# Patient Record
Sex: FEMALE | Race: ASIAN
[De-identification: names, ages, dates, MRNs, and addresses within clinical notes are randomized per-mention and may not be internally consistent; named-entity substitution may affect disease eponyms.]

---

## 2017-11-29 ENCOUNTER — APPOINTMENT (OUTPATIENT)
Dept: OBGYN | Facility: CLINIC | Age: 52
End: 2017-11-29
Payer: COMMERCIAL

## 2017-11-29 VITALS
SYSTOLIC BLOOD PRESSURE: 147 MMHG | DIASTOLIC BLOOD PRESSURE: 95 MMHG | BODY MASS INDEX: 29.95 KG/M2 | HEIGHT: 63 IN | WEIGHT: 169 LBS | HEART RATE: 75 BPM

## 2017-11-29 DIAGNOSIS — N95.9 UNSPECIFIED MENOPAUSAL AND PERIMENOPAUSAL DISORDER: ICD-10-CM

## 2017-11-29 PROCEDURE — 99396 PREV VISIT EST AGE 40-64: CPT

## 2017-12-11 LAB
CYTOLOGY CVX/VAG DOC THIN PREP: NORMAL
HPV HIGH+LOW RISK DNA PNL CVX: NOT DETECTED

## 2019-08-27 ENCOUNTER — APPOINTMENT (OUTPATIENT)
Dept: OBGYN | Facility: CLINIC | Age: 54
End: 2019-08-27
Payer: COMMERCIAL

## 2019-08-27 VITALS
WEIGHT: 164 LBS | HEART RATE: 63 BPM | HEIGHT: 63 IN | DIASTOLIC BLOOD PRESSURE: 88 MMHG | BODY MASS INDEX: 29.06 KG/M2 | SYSTOLIC BLOOD PRESSURE: 147 MMHG

## 2019-08-27 DIAGNOSIS — Z01.419 ENCOUNTER FOR GYNECOLOGICAL EXAMINATION (GENERAL) (ROUTINE) W/OUT ABNORMAL FINDINGS: ICD-10-CM

## 2019-08-27 DIAGNOSIS — N95.2 POSTMENOPAUSAL ATROPHIC VAGINITIS: ICD-10-CM

## 2019-08-27 PROCEDURE — 99396 PREV VISIT EST AGE 40-64: CPT

## 2019-08-27 PROCEDURE — 99213 OFFICE O/P EST LOW 20 MIN: CPT | Mod: 25

## 2019-08-27 NOTE — PHYSICAL EXAM
[Awake] : awake [Alert] : alert [Soft] : soft [Oriented x3] : oriented to person, place, and time [Normal] : uterus [No Bleeding] : there was no active vaginal bleeding [Uterine Adnexae] : were not tender and not enlarged [Vulvar Atrophy] : vulvar atrophy [Atrophy] : atrophy [Acute Distress] : no acute distress [Mass] : no breast mass [Nipple Discharge] : no nipple discharge [Axillary LAD] : no axillary lymphadenopathy [Tender] : non tender

## 2019-08-29 ENCOUNTER — APPOINTMENT (OUTPATIENT)
Dept: OBGYN | Facility: CLINIC | Age: 54
End: 2019-08-29
Payer: COMMERCIAL

## 2019-08-29 ENCOUNTER — ASOB RESULT (OUTPATIENT)
Age: 54
End: 2019-08-29

## 2019-08-29 PROCEDURE — 76830 TRANSVAGINAL US NON-OB: CPT

## 2019-08-30 LAB — HPV HIGH+LOW RISK DNA PNL CVX: NOT DETECTED

## 2019-09-08 LAB — CYTOLOGY CVX/VAG DOC THIN PREP: ABNORMAL

## 2019-09-18 ENCOUNTER — APPOINTMENT (OUTPATIENT)
Dept: OBGYN | Facility: CLINIC | Age: 54
End: 2019-09-18
Payer: COMMERCIAL

## 2019-09-18 VITALS
BODY MASS INDEX: 29.06 KG/M2 | DIASTOLIC BLOOD PRESSURE: 84 MMHG | HEART RATE: 71 BPM | SYSTOLIC BLOOD PRESSURE: 134 MMHG | HEIGHT: 63 IN | WEIGHT: 164 LBS

## 2019-09-18 DIAGNOSIS — N95.0 POSTMENOPAUSAL BLEEDING: ICD-10-CM

## 2019-09-18 PROCEDURE — 58100 BIOPSY OF UTERUS LINING: CPT

## 2019-09-18 PROCEDURE — 99213 OFFICE O/P EST LOW 20 MIN: CPT | Mod: 25

## 2019-09-18 NOTE — PROCEDURE
[Endometrial Biopsy] : Endometrial biopsy [Risks] : risks [Post-Menop. Bleeding] : post-menopausal bleeding [Alternatives] : alternatives [Benefits] : benefits [Patient] : patient [Infection] : infection [Bleeding] : bleeding [Allergic Reaction] : allergic reaction [Pain] : pain [Uterine Perforation] : uterine perforation [CONSENT OBTAINED] : written consent was obtained prior to the procedure. [Neg Pregnancy Test] : a pregnancy test was negative [None] : none [Betadine] : Betadine [1%] : 1% [Without Epi] : without epinephrine [Tenaculum] : a single toothed tenaculum [Anteverted] : anteverted [Easy Passage] : allowed easy passage of a uterine sound without dilation [Pipelle] : a Pipelle endometrial suction curette [Scant] : a scant [Tolerated Well] : the patient tolerated the procedure well [Sent to Histology] : the specimen was place in buffered formalin and sent for pathlogy [No Complications] : there were no complications

## 2019-09-23 LAB — CORE LAB BIOPSY: NORMAL

## 2020-09-11 ENCOUNTER — APPOINTMENT (OUTPATIENT)
Dept: PULMONOLOGY | Facility: CLINIC | Age: 55
End: 2020-09-11
Payer: COMMERCIAL

## 2020-09-11 VITALS
HEIGHT: 62 IN | SYSTOLIC BLOOD PRESSURE: 138 MMHG | DIASTOLIC BLOOD PRESSURE: 90 MMHG | WEIGHT: 154 LBS | OXYGEN SATURATION: 94 % | HEART RATE: 72 BPM | BODY MASS INDEX: 28.34 KG/M2 | TEMPERATURE: 98.5 F

## 2020-09-11 DIAGNOSIS — R05 COUGH: ICD-10-CM

## 2020-09-11 DIAGNOSIS — R76.12 NONSPECIFIC REACTION TO CELL MEDIATED IMMUNITY MEASUREMENT OF GAMMA INTERFERON ANTIGEN RESPONSE W/OUT ACTIVE TUBERCULOSIS: ICD-10-CM

## 2020-09-11 DIAGNOSIS — Z22.7 LATENT TUBERCULOSIS: ICD-10-CM

## 2020-09-11 DIAGNOSIS — Z86.79 PERSONAL HISTORY OF OTHER DISEASES OF THE CIRCULATORY SYSTEM: ICD-10-CM

## 2020-09-11 PROCEDURE — 99204 OFFICE O/P NEW MOD 45 MIN: CPT

## 2020-09-12 PROBLEM — Z86.79 HISTORY OF HYPERTENSION: Status: RESOLVED | Noted: 2020-09-12 | Resolved: 2020-09-12

## 2020-09-12 PROBLEM — R76.12 POSITIVE QUANTIFERON-TB GOLD TEST: Status: ACTIVE | Noted: 2020-09-12

## 2020-09-12 PROBLEM — R05 CHRONIC COUGH: Status: ACTIVE | Noted: 2020-09-12

## 2020-09-12 PROBLEM — Z22.7 LATENT TUBERCULOSIS BY BLOOD TEST: Status: ACTIVE | Noted: 2020-09-12

## 2020-09-12 RX ORDER — VALSARTAN 160 MG/1
160 TABLET, COATED ORAL
Refills: 0 | Status: ACTIVE | COMMUNITY

## 2020-09-12 NOTE — DISCUSSION/SUMMARY
[FreeTextEntry1] : She is a 54 year old who presented with a cough since July or 2020. The cough is non-productive. No chest pain or shortness of breath reported. No fever, chills or sweats. No sick contacts.Occupational history is unremarkable. Never smoked. 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 Never had it done before. She was born and raised in [facility] and [place] the [place] 25. No known exposure to TB either in her native [place] or [place] the [place]. \par \par There are no clinical signs of active TB. She most likely was exposed many years ago and is not a candidate for prophylaxis at this point in time. \par \par Her cough is not likely related to the above. She has tried multiple remedies that did not help her. I suggested if her cough does not resolve in the near future to have a PFT after a current nasopharyngeal swab for COVID-19-PCR has been obtained and the result is negative. \par \par Apart from that I will see her again as needed. \par

## 2020-09-12 NOTE — PROCEDURE
[FreeTextEntry1] : Chest x-ray 8/10/20: No acute disease. \par \par Quantiferon gold assay 2/28/20: positive.

## 2020-09-12 NOTE — PHYSICAL EXAM
[No Acute Distress] : no acute distress [Normal Oropharynx] : normal oropharynx [Normal S1, S2] : normal s1, s2 [No Neck Mass] : no neck mass [No HSM] : no hsm [No Abnormalities] : no abnormalities [Clear to Auscultation Bilaterally] : clear to auscultation bilaterally [No Focal Deficits] : no focal deficits [No Clubbing] : no clubbing [Normal Color/ Pigmentation] : normal color/ pigmentation [Normal Gait] : normal gait [Oriented x3] : oriented x3

## 2020-09-12 NOTE — REVIEW OF SYSTEMS
[Cough] : cough [Fever] : no fever [Chills] : no chills [Hemoptysis] : no hemoptysis [Chest Tightness] : no chest tightness [Sputum] : no sputum [Dyspnea] : no dyspnea [Wheezing] : no wheezing [Chest Discomfort] : no chest discomfort [Hay Fever] : no hay fever [GERD] : no gerd [Arthralgias] : no arthralgias [Myalgias] : no myalgias [Rash] : no rash [Anemia] : no anemia [Headache] : no headache [Diabetes] : no diabetes [Thyroid Problem] : no thyroid problem

## 2020-09-12 NOTE — HISTORY OF PRESENT ILLNESS
[Never] : never [TextBox_4] : She is a 54 year old who presented with a cough for several months. Was found to have a positive IGRA for TB. Never had it done before. The cough is non-productive. No chest pain or shortness of breath reported. No fever, chills or sweats. No sick contacts. [place] [facility] [place] 25. No known exposure to TB either in her native [place]. Occupational history is unremarkable. Never smoked. \par .\par

## 2021-10-05 ENCOUNTER — APPOINTMENT (OUTPATIENT)
Dept: OBGYN | Facility: CLINIC | Age: 56
End: 2021-10-05
Payer: COMMERCIAL

## 2021-10-05 VITALS — DIASTOLIC BLOOD PRESSURE: 86 MMHG | BODY MASS INDEX: 27.44 KG/M2 | SYSTOLIC BLOOD PRESSURE: 140 MMHG | WEIGHT: 150 LBS

## 2021-10-05 DIAGNOSIS — R92.2 INCONCLUSIVE MAMMOGRAM: ICD-10-CM

## 2021-10-05 DIAGNOSIS — Z01.419 ENCOUNTER FOR GYNECOLOGICAL EXAMINATION (GENERAL) (ROUTINE) W/OUT ABNORMAL FINDINGS: ICD-10-CM

## 2021-10-05 PROCEDURE — 99396 PREV VISIT EST AGE 40-64: CPT

## 2021-10-05 NOTE — DISCUSSION/SUMMARY
[FreeTextEntry1] : 56 y/o  LMP 7-8 years ago, no HRT\par pap with cotesting\par Screening Mammogramand Breast U/s dense breasts\par UTD with Colonoscopy\par lubricants for vaginal atrophy, decined estrogen cream\par f/u 1 year/PRN

## 2021-10-05 NOTE — HISTORY OF PRESENT ILLNESS
[FreeTextEntry1] : 56 y/o  LMP 7-8 years ago, no HRT\par no hot flashes, + vaginal dryness \par colonoscopy 5 years ago

## 2021-10-05 NOTE — PHYSICAL EXAM
[Examination Of The Breasts] : a normal appearance [No Masses] : no breast masses were palpable [Labia Majora] : normal [Labia Minora] : normal [Atrophy] : atrophy [Normal] : normal [Uterine Adnexae] : normal

## 2021-10-07 ENCOUNTER — TRANSCRIPTION ENCOUNTER (OUTPATIENT)
Age: 56
End: 2021-10-07

## 2021-10-07 LAB — HPV HIGH+LOW RISK DNA PNL CVX: NOT DETECTED

## 2021-10-10 LAB — CYTOLOGY CVX/VAG DOC THIN PREP: ABNORMAL

## 2022-02-10 ENCOUNTER — APPOINTMENT (OUTPATIENT)
Dept: OBGYN | Facility: CLINIC | Age: 57
End: 2022-02-10

## 2022-11-17 ENCOUNTER — APPOINTMENT (OUTPATIENT)
Dept: OBGYN | Facility: CLINIC | Age: 57
End: 2022-11-17

## 2022-11-17 VITALS
DIASTOLIC BLOOD PRESSURE: 84 MMHG | SYSTOLIC BLOOD PRESSURE: 120 MMHG | BODY MASS INDEX: 30.36 KG/M2 | HEIGHT: 62 IN | WEIGHT: 165 LBS

## 2022-11-17 PROCEDURE — 99396 PREV VISIT EST AGE 40-64: CPT

## 2022-11-17 NOTE — HISTORY OF PRESENT ILLNESS
[FreeTextEntry1] : 55 y/o postmenopausal woman \par Pap 2021 neg\par UTD with mammogram\par colonoscopy last week\par no vaginal bleeding, no complaints COVID vaccinated 2 + booster last year

## 2022-11-17 NOTE — DISCUSSION/SUMMARY
[FreeTextEntry1] : 55 y/o postmenopausal woman \par Pap 2021 neg\par UTD with mammogram,  patient brought report, refer for breast us\par colonoscopy last week\par vaginal atrophy, occasional discomfort, no bleeding  waterbased lubricants. \par f/u 1 year

## 2023-12-11 ENCOUNTER — NON-APPOINTMENT (OUTPATIENT)
Age: 58
End: 2023-12-11

## 2023-12-15 ENCOUNTER — APPOINTMENT (OUTPATIENT)
Dept: OBGYN | Facility: CLINIC | Age: 58
End: 2023-12-15
Payer: COMMERCIAL

## 2023-12-15 VITALS — WEIGHT: 167.8 LBS | BODY MASS INDEX: 30.69 KG/M2 | DIASTOLIC BLOOD PRESSURE: 88 MMHG | SYSTOLIC BLOOD PRESSURE: 144 MMHG

## 2023-12-15 DIAGNOSIS — Z01.419 ENCOUNTER FOR GYNECOLOGICAL EXAMINATION (GENERAL) (ROUTINE) W/OUT ABNORMAL FINDINGS: ICD-10-CM

## 2023-12-15 PROCEDURE — 99396 PREV VISIT EST AGE 40-64: CPT

## 2023-12-15 NOTE — DISCUSSION/SUMMARY
[FreeTextEntry1] : 59 y/o postmenopausal woman  Pap smear with cotesting  Screening mammogram Screening colonoscopy

## 2024-11-12 ENCOUNTER — APPOINTMENT (OUTPATIENT)
Dept: OBGYN | Facility: CLINIC | Age: 59
End: 2024-11-12
Payer: COMMERCIAL

## 2024-11-12 VITALS
HEIGHT: 62 IN | DIASTOLIC BLOOD PRESSURE: 86 MMHG | BODY MASS INDEX: 31.28 KG/M2 | SYSTOLIC BLOOD PRESSURE: 130 MMHG | WEIGHT: 170 LBS

## 2024-11-12 DIAGNOSIS — Z01.419 ENCOUNTER FOR GYNECOLOGICAL EXAMINATION (GENERAL) (ROUTINE) W/OUT ABNORMAL FINDINGS: ICD-10-CM

## 2024-11-12 PROCEDURE — 99396 PREV VISIT EST AGE 40-64: CPT

## 2024-11-15 LAB — HPV HIGH+LOW RISK DNA PNL CVX: NOT DETECTED

## 2024-11-19 LAB — CYTOLOGY CVX/VAG DOC THIN PREP: ABNORMAL

## 2025-01-31 ENCOUNTER — EMERGENCY (EMERGENCY)
Facility: HOSPITAL | Age: 60
LOS: 1 days | Discharge: HOME | End: 2025-01-31
Attending: EMERGENCY MEDICINE
Payer: COMMERCIAL

## 2025-01-31 VITALS
WEIGHT: 164.91 LBS | HEART RATE: 85 BPM | DIASTOLIC BLOOD PRESSURE: 85 MMHG | OXYGEN SATURATION: 96 % | HEIGHT: 64 IN | TEMPERATURE: 98 F | SYSTOLIC BLOOD PRESSURE: 127 MMHG | RESPIRATION RATE: 16 BRPM

## 2025-01-31 PROCEDURE — 99285 EMERGENCY DEPT VISIT HI MDM: CPT

## 2025-01-31 NOTE — ED ADULT TRIAGE NOTE - SOURCE OF INFORMATION
Medicare Annual Wellness Visit    Davon Tab is here for Medicare AWV    Assessment & Plan   Medicare annual wellness visit, subsequent  Dyskinesia  Paroxysmal atrial fibrillation (HCC)  Sensory neuropathy  -     CBC with Auto Differential; Future  -     Comprehensive Metabolic Panel; Future  Cognitive disorder  Impaired functional mobility, balance, gait, and endurance  Mild episode of recurrent major depressive disorder ([facility] [address]    Recommendations for Preventive Services Due: see orders and patient instructions/AVS.  Recommended screening schedule for the next 5-10 years is provided to the patient in written form: see Patient Instructions/AVS.   Dexa nl 2018--5 yr ok; has mammo for Feb ;Neurology is Jan and can hopefully assess the muscle sx  Return for Medicare Annual Wellness Visit in 1 year. Subjective   The following acute and/or chronic problems were also addressed today:  Dementia; neuropathy; movements-followed by Dr Vignesh Hamilton -hx cognitive deficit-on Namenda and stable; has NCV with neuropathy changes; on gabapentin for sx; has leg movements and trial of sinemet not helped  Mood fine on zoloft  Patient's complete Health Risk Assessment and screening values have been reviewed and are found in Flowsheets. The following problems were reviewed today and where indicated follow up appointments were made and/or referrals ordered.     Positive Risk Factor Screenings with Interventions:    Fall Risk:  Do you feel unsteady or are you worried about falling? : (!) yes  2 or more falls in past year?: no  Fall with injury in past year?: no   Fall Risk Interventions:    Patient declines any further evaluation/treatment for this issue  Has help 5d -for housekeeping, bath and home assist            General Health and ACP:  General  In general, how would you say your health is?: Fair  In the past 7 days, have you experienced any of the following: New or Increased Pain, New or Increased Fatigue, Loneliness, Social Isolation, Stress or Anger?: No  Do you get the social and emotional support that you need?: (!) No  Do you have a Living Will?: Yes    Advance Directives       Power of [address] Will ACP-Advance Directive ACP-Power of     Not on File Not on File Not on File Filed        General Health Risk Interventions:  No pain ; independent in small apt    Health Habits/Nutrition:  Physical Activity: Inactive    Days of Exercise per Week: 0 days    Minutes of Exercise per Session: 0 min     Have you lost any weight without trying in the past 3 months?: (!) Yes  Body mass index: 22.59  Have you seen the dentist within the past year?: (!) No  Health Habits/Nutrition Interventions:  Uses rolling walker at times -no falls in over a year      ADLs:  In the past 7 days, did you need help from others to perform any of the following everyday activities: Eating, dressing, grooming, bathing, toileting, or walking/balance?: (!) Yes  Select all that apply: (!) Bathing  In the past 7 days, did you need help from others to take care of any of the following: Laundry, housekeeping, banking/finances, shopping, telephone use, food preparation, transportation, or taking medications?: (!) Yes  Select all that apply: [employer], Shopping, Taking Medications  ADL Interventions:  Patient declines any further evaluation/treatment for this issue          Objective   Vitals:    11/23/22 0933 11/23/22 0940 11/23/22 1022   BP: (!) 154/102 (!) 156/92 137/86   Pulse: 70 72    Temp: 98 °F (36.7 °C)     SpO2: 98%     Weight: 123 lb 8 oz (56 kg)     Height: 5' 2\" (1.575 m)        Body mass index is 22.59 kg/m².       General Appearance: alert and oriented to person, place and time, well-developed and well-nourished, in no acute distress  Pulmonary/Chest: clear to auscultation bilaterally- no wheezes, rales or rhonchi, normal air movement, no respiratory distress  Cardiovascular: normal rate, normal S1 and S2, no gallops, intact distal pulses, and no carotid bruits     Rhythmic muscle contractions proximal thighs -right more than left -cause foot taps--no arm cogwheel or tremor-does shuffle in gait  Allergies   Allergen Reactions    Aspirin Nausea Only     Pt can take aspirin 81mg Pt c/o upset stomach with higher dose    Atorvastatin Other (See Comments)     Leg weakness    Codeine Nausea Only    Fluticasone-Salmeterol Other (See Comments)     shakes     Prior to Visit Medications    Medication Sig Taking? Authorizing Provider   carbidopa-levodopa (SINEMET)  MG per tablet Take 1 tablet by mouth 2 times daily Yes Lupis Hawthorne MD   gabapentin (NEURONTIN) 400 MG capsule Take 1 capsule by mouth 3 times daily.  Yes Lupis Hawthorne MD   montelukast (SINGULAIR) 10 MG tablet TAKE 1 TABLET BY MOUTH EVERY DAY Yes TYRONE Persaud MD   nitrofurantoin (MACRODANTIN) 100 MG capsule TAKE 1 CAPSULE BY MOUTH EVERY DAY AT NIGHT Yes Darilyn Moritz, MD   memantine (NAMENDA) 10 MG tablet Take 1 tablet by mouth 2 times daily Yes Lupis Hawthorne MD   OXYGEN Inhale into the lungs 2 liters at night only Yes Ar Automatic Reconciliation   albuterol sulfate  (90 Base) MCG/ACT inhaler Inhale 2 puffs into the lungs every 4 hours as needed for Shortness of Breath or Wheezing Yes Ar Automatic Reconciliation   allopurinol (ZYLOPRIM) 100 MG tablet TAKE 1 TABLET BY MOUTH EVERY DAY Yes Ar Automatic Reconciliation   apixaban (ELIQUIS) 5 MG TABS tablet Take 5 mg by mouth 2 times daily Yes Ar Automatic Reconciliation   aspirin 81 MG EC tablet Take 81 mg by mouth daily Yes Ar Automatic Reconciliation   bumetanide (BUMEX) 1 MG tablet Take 1 mg by mouth every other day Yes Ar Automatic Reconciliation   diclofenac sodium (VOLTAREN) 1 % GEL Apply topically 4 times daily Yes Ar Automatic Reconciliation   lansoprazole (PREVACID) 30 MG delayed release capsule TAKE 1 CAPSULE BY MOUTH TWICE A DAY Yes Ar Automatic Reconciliation   metoprolol tartrate (LOPRESSOR) 25 MG tablet Take 25 mg by mouth 2 times daily Yes Ar Automatic Reconciliation   sertraline (ZOLOFT) 50 MG tablet TAKE 1 TABLET BY MOUTH EVERY DAY Yes Ar Automatic Reconciliation   tamoxifen (NOLVADEX) 20 MG tablet TAKE 1 TABLET BY MOUTH EVERY DAY Yes Ar Automatic Reconciliation       CareTeam (Including outside providers/suppliers regularly involved in providing care):   Patient Care Team:  Carlos Weaver MD as PCP - [facility]  Carlos Weaver MD as PCP - [facility] Empaneled Provider     Reviewed and updated this visit:  Tobacco  Allergies  Meds  Problems  Med Hx  Surg Hx  Soc Hx  Fam Hx Patient

## 2025-01-31 NOTE — ED ADULT TRIAGE NOTE - CHIEF COMPLAINT QUOTE
Bloody stools x1 day w/ abdominal cramping. Pt reports BM every x2 hours w/ clots and n/v. Pt denies AC use.

## 2025-02-01 VITALS
RESPIRATION RATE: 18 BRPM | HEART RATE: 78 BPM | TEMPERATURE: 98 F | OXYGEN SATURATION: 96 % | SYSTOLIC BLOOD PRESSURE: 115 MMHG | DIASTOLIC BLOOD PRESSURE: 73 MMHG

## 2025-02-01 LAB
ALBUMIN SERPL ELPH-MCNC: 4.6 G/DL — SIGNIFICANT CHANGE UP (ref 3.3–5)
ALP SERPL-CCNC: 100 U/L — SIGNIFICANT CHANGE UP (ref 40–120)
ALT FLD-CCNC: 17 U/L — SIGNIFICANT CHANGE UP (ref 10–45)
ANION GAP SERPL CALC-SCNC: 15 MMOL/L — SIGNIFICANT CHANGE UP (ref 5–17)
APPEARANCE UR: CLEAR — SIGNIFICANT CHANGE UP
AST SERPL-CCNC: 18 U/L — SIGNIFICANT CHANGE UP (ref 10–40)
BACTERIA # UR AUTO: NEGATIVE /HPF — SIGNIFICANT CHANGE UP
BASOPHILS # BLD AUTO: 0.04 K/UL — SIGNIFICANT CHANGE UP (ref 0–0.2)
BASOPHILS NFR BLD AUTO: 0.3 % — SIGNIFICANT CHANGE UP (ref 0–2)
BILIRUB SERPL-MCNC: 0.8 MG/DL — SIGNIFICANT CHANGE UP (ref 0.2–1.2)
BILIRUB UR-MCNC: ABNORMAL
BUN SERPL-MCNC: 17 MG/DL — SIGNIFICANT CHANGE UP (ref 7–23)
CALCIUM SERPL-MCNC: 10.2 MG/DL — SIGNIFICANT CHANGE UP (ref 8.4–10.5)
CAST: 0 /LPF — SIGNIFICANT CHANGE UP (ref 0–4)
CHLORIDE SERPL-SCNC: 102 MMOL/L — SIGNIFICANT CHANGE UP (ref 96–108)
CO2 SERPL-SCNC: 23 MMOL/L — SIGNIFICANT CHANGE UP (ref 22–31)
COLOR SPEC: SIGNIFICANT CHANGE UP
CREAT SERPL-MCNC: 0.49 MG/DL — LOW (ref 0.5–1.3)
DIFF PNL FLD: ABNORMAL
EGFR: 109 ML/MIN/1.73M2 — SIGNIFICANT CHANGE UP
EOSINOPHIL # BLD AUTO: 0.05 K/UL — SIGNIFICANT CHANGE UP (ref 0–0.5)
EOSINOPHIL NFR BLD AUTO: 0.4 % — SIGNIFICANT CHANGE UP (ref 0–6)
GAS PNL BLDV: SIGNIFICANT CHANGE UP
GLUCOSE SERPL-MCNC: 108 MG/DL — HIGH (ref 70–99)
GLUCOSE UR QL: NEGATIVE MG/DL — SIGNIFICANT CHANGE UP
HCT VFR BLD CALC: 38.4 % — SIGNIFICANT CHANGE UP (ref 34.5–45)
HCT VFR BLD CALC: 41.5 % — SIGNIFICANT CHANGE UP (ref 34.5–45)
HGB BLD-MCNC: 12.8 G/DL — SIGNIFICANT CHANGE UP (ref 11.5–15.5)
HGB BLD-MCNC: 13.8 G/DL — SIGNIFICANT CHANGE UP (ref 11.5–15.5)
IMM GRANULOCYTES NFR BLD AUTO: 0.4 % — SIGNIFICANT CHANGE UP (ref 0–0.9)
KETONES UR-MCNC: 80 MG/DL
LEUKOCYTE ESTERASE UR-ACNC: ABNORMAL
LIDOCAIN IGE QN: 26 U/L — SIGNIFICANT CHANGE UP (ref 7–60)
LYMPHOCYTES # BLD AUTO: 27.1 % — SIGNIFICANT CHANGE UP (ref 13–44)
LYMPHOCYTES # BLD AUTO: 3.27 K/UL — SIGNIFICANT CHANGE UP (ref 1–3.3)
MCHC RBC-ENTMCNC: 30.8 PG — SIGNIFICANT CHANGE UP (ref 27–34)
MCHC RBC-ENTMCNC: 30.9 PG — SIGNIFICANT CHANGE UP (ref 27–34)
MCHC RBC-ENTMCNC: 33.3 G/DL — SIGNIFICANT CHANGE UP (ref 32–36)
MCHC RBC-ENTMCNC: 33.3 G/DL — SIGNIFICANT CHANGE UP (ref 32–36)
MCV RBC AUTO: 92.5 FL — SIGNIFICANT CHANGE UP (ref 80–100)
MCV RBC AUTO: 92.8 FL — SIGNIFICANT CHANGE UP (ref 80–100)
MONOCYTES # BLD AUTO: 0.64 K/UL — SIGNIFICANT CHANGE UP (ref 0–0.9)
MONOCYTES NFR BLD AUTO: 5.3 % — SIGNIFICANT CHANGE UP (ref 2–14)
NEUTROPHILS # BLD AUTO: 8 K/UL — HIGH (ref 1.8–7.4)
NEUTROPHILS NFR BLD AUTO: 66.5 % — SIGNIFICANT CHANGE UP (ref 43–77)
NITRITE UR-MCNC: NEGATIVE — SIGNIFICANT CHANGE UP
NRBC # BLD: 0 /100 WBCS — SIGNIFICANT CHANGE UP (ref 0–0)
NRBC # BLD: 0 /100 WBCS — SIGNIFICANT CHANGE UP (ref 0–0)
NRBC BLD-RTO: 0 /100 WBCS — SIGNIFICANT CHANGE UP (ref 0–0)
NRBC BLD-RTO: 0 /100 WBCS — SIGNIFICANT CHANGE UP (ref 0–0)
PH UR: 5.5 — SIGNIFICANT CHANGE UP (ref 5–8)
PLATELET # BLD AUTO: 294 K/UL — SIGNIFICANT CHANGE UP (ref 150–400)
PLATELET # BLD AUTO: 318 K/UL — SIGNIFICANT CHANGE UP (ref 150–400)
POTASSIUM SERPL-MCNC: 3.8 MMOL/L — SIGNIFICANT CHANGE UP (ref 3.5–5.3)
POTASSIUM SERPL-SCNC: 3.8 MMOL/L — SIGNIFICANT CHANGE UP (ref 3.5–5.3)
PROT SERPL-MCNC: 8 G/DL — SIGNIFICANT CHANGE UP (ref 6–8.3)
PROT UR-MCNC: 30 MG/DL
RBC # BLD: 4.15 M/UL — SIGNIFICANT CHANGE UP (ref 3.8–5.2)
RBC # BLD: 4.47 M/UL — SIGNIFICANT CHANGE UP (ref 3.8–5.2)
RBC # FLD: 12.5 % — SIGNIFICANT CHANGE UP (ref 10.3–14.5)
RBC # FLD: 12.6 % — SIGNIFICANT CHANGE UP (ref 10.3–14.5)
RBC CASTS # UR COMP ASSIST: 29 /HPF — HIGH (ref 0–4)
SODIUM SERPL-SCNC: 140 MMOL/L — SIGNIFICANT CHANGE UP (ref 135–145)
SP GR SPEC: >1.03 — HIGH (ref 1–1.03)
SQUAMOUS # UR AUTO: 1 /HPF — SIGNIFICANT CHANGE UP (ref 0–5)
UROBILINOGEN FLD QL: 1 MG/DL — SIGNIFICANT CHANGE UP (ref 0.2–1)
WBC # BLD: 12.05 K/UL — HIGH (ref 3.8–10.5)
WBC # BLD: 9.73 K/UL — SIGNIFICANT CHANGE UP (ref 3.8–10.5)
WBC # FLD AUTO: 12.05 K/UL — HIGH (ref 3.8–10.5)
WBC # FLD AUTO: 9.73 K/UL — SIGNIFICANT CHANGE UP (ref 3.8–10.5)
WBC UR QL: 7 /HPF — HIGH (ref 0–5)

## 2025-02-01 PROCEDURE — 85025 COMPLETE CBC W/AUTO DIFF WBC: CPT

## 2025-02-01 PROCEDURE — 99222 1ST HOSP IP/OBS MODERATE 55: CPT

## 2025-02-01 PROCEDURE — 87086 URINE CULTURE/COLONY COUNT: CPT

## 2025-02-01 PROCEDURE — 83605 ASSAY OF LACTIC ACID: CPT

## 2025-02-01 PROCEDURE — 84295 ASSAY OF SERUM SODIUM: CPT

## 2025-02-01 PROCEDURE — 74177 CT ABD & PELVIS W/CONTRAST: CPT | Mod: MC

## 2025-02-01 PROCEDURE — 80053 COMPREHEN METABOLIC PANEL: CPT

## 2025-02-01 PROCEDURE — 83690 ASSAY OF LIPASE: CPT

## 2025-02-01 PROCEDURE — 85018 HEMOGLOBIN: CPT

## 2025-02-01 PROCEDURE — 74177 CT ABD & PELVIS W/CONTRAST: CPT | Mod: 26

## 2025-02-01 PROCEDURE — 99285 EMERGENCY DEPT VISIT HI MDM: CPT | Mod: 25

## 2025-02-01 PROCEDURE — 96374 THER/PROPH/DIAG INJ IV PUSH: CPT

## 2025-02-01 PROCEDURE — 82330 ASSAY OF CALCIUM: CPT

## 2025-02-01 PROCEDURE — 85027 COMPLETE CBC AUTOMATED: CPT

## 2025-02-01 PROCEDURE — 84132 ASSAY OF SERUM POTASSIUM: CPT

## 2025-02-01 PROCEDURE — 82435 ASSAY OF BLOOD CHLORIDE: CPT

## 2025-02-01 PROCEDURE — 82947 ASSAY GLUCOSE BLOOD QUANT: CPT

## 2025-02-01 PROCEDURE — G0378: CPT

## 2025-02-01 PROCEDURE — 81001 URINALYSIS AUTO W/SCOPE: CPT

## 2025-02-01 PROCEDURE — 82803 BLOOD GASES ANY COMBINATION: CPT

## 2025-02-01 PROCEDURE — 85014 HEMATOCRIT: CPT

## 2025-02-01 PROCEDURE — 96375 TX/PRO/DX INJ NEW DRUG ADDON: CPT

## 2025-02-01 PROCEDURE — 93005 ELECTROCARDIOGRAM TRACING: CPT

## 2025-02-01 RX ORDER — CIPROFLOXACIN HCL 500 MG
400 TABLET ORAL EVERY 12 HOURS
Refills: 0 | Status: DISCONTINUED | OUTPATIENT
Start: 2025-02-01 | End: 2025-02-04

## 2025-02-01 RX ORDER — CIPROFLOXACIN HCL 500 MG
400 TABLET ORAL ONCE
Refills: 0 | Status: COMPLETED | OUTPATIENT
Start: 2025-02-01 | End: 2025-02-01

## 2025-02-01 RX ORDER — BACTERIOSTATIC SODIUM CHLORIDE 0.9 %
1000 VIAL (ML) INJECTION
Refills: 0 | Status: DISCONTINUED | OUTPATIENT
Start: 2025-02-01 | End: 2025-02-04

## 2025-02-01 RX ORDER — METRONIDAZOLE 500 MG
500 TABLET ORAL ONCE
Refills: 0 | Status: COMPLETED | OUTPATIENT
Start: 2025-02-01 | End: 2025-02-01

## 2025-02-01 RX ORDER — ACETAMINOPHEN 160 MG/5ML
1000 SUSPENSION ORAL ONCE
Refills: 0 | Status: COMPLETED | OUTPATIENT
Start: 2025-02-01 | End: 2025-02-01

## 2025-02-01 RX ORDER — METRONIDAZOLE 500 MG
1 TABLET ORAL
Qty: 14 | Refills: 0
Start: 2025-02-01 | End: 2025-02-07

## 2025-02-01 RX ORDER — SODIUM CHLORIDE 9 G/ML
1000 INJECTION, SOLUTION INTRAVENOUS ONCE
Refills: 0 | Status: COMPLETED | OUTPATIENT
Start: 2025-02-01 | End: 2025-02-01

## 2025-02-01 RX ORDER — METRONIDAZOLE 500 MG
TABLET ORAL
Refills: 0 | Status: DISCONTINUED | OUTPATIENT
Start: 2025-02-01 | End: 2025-02-04

## 2025-02-01 RX ORDER — METRONIDAZOLE 500 MG
500 TABLET ORAL EVERY 12 HOURS
Refills: 0 | Status: DISCONTINUED | OUTPATIENT
Start: 2025-02-01 | End: 2025-02-04

## 2025-02-01 RX ORDER — CIPROFLOXACIN HCL 500 MG
1 TABLET ORAL
Qty: 14 | Refills: 0
Start: 2025-02-01 | End: 2025-02-07

## 2025-02-01 RX ORDER — CIPROFLOXACIN HCL 500 MG
TABLET ORAL
Refills: 0 | Status: DISCONTINUED | OUTPATIENT
Start: 2025-02-01 | End: 2025-02-04

## 2025-02-01 RX ORDER — VALSARTAN 80 MG
320 TABLET ORAL DAILY
Refills: 0 | Status: DISCONTINUED | OUTPATIENT
Start: 2025-02-01 | End: 2025-02-04

## 2025-02-01 RX ADMIN — SODIUM CHLORIDE 1000 MILLILITER(S): 9 INJECTION, SOLUTION INTRAVENOUS at 00:53

## 2025-02-01 RX ADMIN — Medication 100 MILLIGRAM(S): at 08:47

## 2025-02-01 RX ADMIN — Medication 200 MILLIGRAM(S): at 06:10

## 2025-02-01 NOTE — ED CDU PROVIDER DISPOSITION NOTE - ATTENDING APP SHARED VISIT CONTRIBUTION OF CARE
60 yo female denies PMH presented for bloody BM, vomiting yesterday.  not on a/c.  CT abdomen/pelvis with ?colitis, WBC mildly elevated.  sent to CDU for monitoring, trend Hgb, serial exam, IV abx.    no acute events overnight.  Hgb this AM normal.  no bloody BM.  well-appearing, stable for d/c on PO antibiotics and outpatient follow-up.

## 2025-02-01 NOTE — ED CDU PROVIDER INITIAL DAY NOTE - DETAILS
antibiotics, pain control as needed, IVF  monitor bleeding   DW Arata, vitals every 4 hours, frequent reevaluations

## 2025-02-01 NOTE — ED PROVIDER NOTE - ATTENDING CONTRIBUTION TO CARE
Attending MD Esteves: I personally have seen and examined this patient.  Resident note reviewed and agree on plan of care and except where noted.  See below for details.     Seen in Red 36L    59F with no reported contributory PMH/PSH/Meds, no known drug allergies presents to the ED with bloody BMs.  Reports has had 7-8 bright red blood per rectum, reports small BMs.  Reports associated cramping since 3am.  Reports nausea, nonbloody, nonbilious vomiting.  Reports had colonoscopy two years ago, denies diverticular disease, hemorrhoids.  Denies previous episode.  Denies AC use.  Denies recent travel, sick contacts, recent abx.  Denies fevers, chills.  Denies chest pain, shortness of breath, lightheadedness, LOC.     Exam:   General: calm  HENT: head NCAT, airway patent  Eyes: anicteric, no conjunctival injection   Lungs: lungs CTAB with good inspiratory effort, no wheezing, no rhonchi, no rales  Cardiac: +S1S2, no obvious m/r/g  GI: abdomen soft with +BS, +mild tenderness to lower abdomen, no rebound, no guarding, ND  : no CVAT  MSK: ranging neck and extremities freely  Neuro: moving all extremities spontaneously, nonfocal  Psych: normal mood and affect     A/P: 59F with bright red blood per rectum, associated abdominal cramping, n/v, possible viral illness, diverticular bleed, will obtain CT GIB, will obtain labs, will send UA/UrCx, will give IVFs, will give analgesia

## 2025-02-01 NOTE — ED PROVIDER NOTE - CLINICAL SUMMARY MEDICAL DECISION MAKING FREE TEXT BOX
59F presenting with BRBPR today, multiple episodes, with abdominal cramping, nausea and vomiting. HD stable, vitals WNL. Nontoxic appearing, abdomen soft with mild lower abdominal tenderness to palpation. Patient provided images of bright red blood in toilet bowl surrounding small quantity of stool. Ddx includes hemorrhoids, diverticulosis, diverticulitis/colitis, angiodysplasia, malignancy. Plan for labs, CT abd/pel, reassess.

## 2025-02-01 NOTE — ED CDU PROVIDER DISPOSITION NOTE - CLINICAL COURSE
59F with no significant pmhx presents to the ED complaining of bloody stool yesterday. Endorsing 7-8 episodes of BRBPR with small BM. Associated abdominal cramping starting at 3am yesterday. States she feels the urge to have a bowel movement every 2 hours. Associated nausea and 4 episodes of nonbloody vomiting. States she had colonoscopy 2 years ago with no significant findings per patient. Denies hx fo diverticulosis or hemorrhoids to her knowledge. No hx of bleeding in the past. Denies A/C use. Denies recent travel, sick contacts, or recent abx. No fever, chest pain, sob.  ED course: Afebrile. WBC 12.05. CT showed "Mild edematous wall thickening of the distal transverse colon and descending colon compatible with colitis. 6 mm left lower lobe pulmonary nodule." Started on Cipro/Flagyl. Patient reports only 1 episode of rectal bleeding in the ER. Plan to continue hydration and antibiotics in CDU. 59F with no significant pmhx presents to the ED complaining of bloody stool yesterday. Endorsing 7-8 episodes of BRBPR with small BM. Associated abdominal cramping starting at 3am yesterday. States she feels the urge to have a bowel movement every 2 hours. Associated nausea and 4 episodes of nonbloody vomiting. States she had colonoscopy 2 years ago with no significant findings per patient. Denies hx fo diverticulosis or hemorrhoids to her knowledge. No hx of bleeding in the past. Denies A/C use. Denies recent travel, sick contacts, or recent abx. No fever, chest pain, sob.  ED course: Afebrile. WBC 12.05. CT showed "Mild edematous wall thickening of the distal transverse colon and descending colon compatible with colitis. 6 mm left lower lobe pulmonary nodule." Started on Cipro/Flagyl. Patient reports only 1 episode of rectal bleeding in the ER. Plan to continue hydration and antibiotics in CDU.  In the CDU, Patient seen at bedside in NAD.  VSS.  Patient resting comfortably without complaints. Repeat CBC w/ resolution of leukocytosis, Hgb stable. No additional episodes of bloody stool. Pt tolerated PO. Aware to continue abx as an outpt. Advised small, light meals and to advance diet slowly as tolerated. To f/u with her GI physician, Dr. Nicola Arce. Copy of results provided. Patient stable for discharge.

## 2025-02-01 NOTE — ED CDU PROVIDER INITIAL DAY NOTE - PROGRESS NOTE DETAILS
Patient seen at bedside in NAD.  VSS.  Patient resting comfortably without complaints. Repeat CBC w/ resolution of leukocytosis. Pt tolerated PO. Aware to continue abx as an outpt. Advised small, light meals and to advance diet slowly as tolerated. To f/u with her GI physician, Dr. Nicola Arce. Copy of results provided. Patient stable for discharge.  Follow up instructions given, return to ED precautions reviewed. Importance of follow up emphasized, patient verbalized understanding.  All questions answered. Case dw Dr. Steiner. Tatiana Nsah PA-C Patient seen at bedside in NAD.  VSS.  Patient resting comfortably without complaints. Repeat CBC w/ resolution of leukocytosis, Hgb stable. No additional episodes of bloody stool. Pt tolerated PO. Aware to continue abx as an outpt. Advised small, light meals and to advance diet slowly as tolerated. To f/u with her GI physician, Dr. Nicola Arce. Copy of results provided. Patient stable for discharge.  Follow up instructions given, return to ED precautions reviewed. Importance of follow up emphasized, patient verbalized understanding.  All questions answered. Case dw Dr. Steiner. Tatiana Nash PA-C

## 2025-02-01 NOTE — ED CDU PROVIDER DISPOSITION NOTE - PATIENT PORTAL LINK FT
You can access the FollowMyHealth Patient Portal offered by [facility] by registering at the following website: [email or web address]. By joining [employer]’s FollowMyHealth portal, you will also be able to view your health information using other applications (apps) compatible with our system.

## 2025-02-01 NOTE — ED ADULT NURSE REASSESSMENT NOTE - NS ED NURSE REASSESS COMMENT FT1
07.00 Received the Pt from  [facility] Pt is Observed for Colitis with bloody stools Received the Pt A&OX 4  Pt obeys commands Hazel N/V/ fever chills cp SOB   Comfort care & safety measures continued  IV site looks clean & dry no signs of infiltration noted pt denies  pain IV site .Pt is oriented to the unit Plan of care explained .  Pt is advised to call for help  call bell with in the reach pt verbalized the understanding .  pending CDU  MD brooks . GCS 15/15 A&OX 4 PERRLA  size 3 Strong upper & lower extremities steady gait  Pt is ambulatory & independent   No facial droop  No Hand Leg drop denies numbness tingling Pt states she had  2 episodes Bloody stools here Plan of care ongoing
no

## 2025-02-01 NOTE — ED CDU PROVIDER DISPOSITION NOTE - NSFOLLOWUPINSTRUCTIONS_ED_ALL_ED_FT
Hydrate.     **ABX    **GI?     We recommend you follow up with your primary care provider within the next 2-3 days, please bring all of your results with you.    Please return to the Emergency Department with new, worsening, or concerning symptoms, such as:  -Worsening or severe pain  -Increased bleeding   -Facial drooping, arm weakness, or speech difficulty   -Head injury or loss of consciousness   -Nonstop bleeding or an open wound     *More detailed information regarding your visit and discharge can be found by reviewing this packet Please make sure to follow up with your primary care doctor within 1-2 days and with the GASTROENTEROLOGY specialist. The information for follow up can be found below. Bring a copy of all of your results with you to your follow up appointments.   Return to the ER as discussed if you develop any new or worsening symptoms.     Take the antibiotics as prescribed. Make sure to complete the entire course as directed.     Eat small, light meals. Advance your diet as tolerated. Please make sure to follow up with your primary care doctor within 1-2 days and with your GASTROENTEROLOGY specialist. The information for follow up can be found below. Bring a copy of all of your results with you to your follow up appointments.   Return to the ER as discussed if you develop any new or worsening symptoms.     Take the antibiotics as prescribed. Make sure to complete the entire course as directed.     Eat small, light meals. Advance your diet as tolerated.      Nicola Arce MD  Gastroenterologist in [address], [place] [address]  Phone: [phone]

## 2025-02-01 NOTE — ED CDU PROVIDER INITIAL DAY NOTE - OBJECTIVE STATEMENT
59F with no significant pmhx presents to the ED complaining of bloody stool yesterday. Endorsing 7-8 episodes of BRBPR with small BM. Associated abdominal cramping starting at 3am yesterday. States she feels the urge to have a bowel movement every 2 hours. Associated nausea and 4 episodes of nonbloody vomiting. States she had colonoscopy 2 years ago with no significant findings per patient. Denies hx fo diverticulosis or hemorrhoids to her knowledge. No hx of bleeding in the past. Denies A/C use. Denies recent travel, sick contacts, or recent abx. No fever, chest pain, sob.  ED course: Afebrile. WBC 12.05. CT showed "Mild edematous wall thickening of the distal transverse colon and descending colon compatible with colitis. 6 mm left lower lobe pulmonary nodule." Started on Cipro/Flagyl. Patient reports only 1 episode of rectal bleeding in the ER. Plan to continue hydration and antibiotics in CDU.

## 2025-02-01 NOTE — ED ADULT NURSE NOTE - OBJECTIVE STATEMENT
60 y/o F presents to the ED with complaints of diffuse lower abd pain, bloody stool, and non-bloody emesis. Pt also endorses nausea, denies chest pain, sob, urinary symptoms. Pt A&Ox3 gross neuro intact, no difficulty speaking in complete sentences, pulses x 4, osorio x4, abdomen soft nontender nondistended, skin intact, iv 20g placed in RAC

## 2025-02-01 NOTE — ED CDU PROVIDER INITIAL DAY NOTE - ATTENDING APP SHARED VISIT CONTRIBUTION OF CARE
Attending MD Esteves:   I personally have seen and examined this patient.  Physician assistant note reviewed and agree on plan of care and except where noted.  See below for details.     Seen in [facility]    59F with no reported contributory PMH/PSH/Meds, no known drug allergies presents to the ED with bloody BMs.  Reports has had 7-8 bright red blood per rectum, reports small BMs.  Reports associated cramping since 3am.  Reports nausea, nonbloody, nonbilious vomiting.  Reports had colonoscopy two years ago, denies diverticular disease, hemorrhoids.  Denies previous episode.  Denies AC use.  Denies recent travel, sick contacts, recent abx.  Denies fevers, chills.  Denies chest pain, shortness of breath, lightheadedness, LOC.     Exam:   General: calm  HENT: head NCAT, airway patent  Eyes: anicteric, no conjunctival injection   Lungs: lungs CTAB with good inspiratory effort, no wheezing, no rhonchi, no rales  Cardiac: +S1S2, no obvious m/r/g  GI: abdomen soft with +BS, +mild tenderness to lower abdomen, no rebound, no guarding, ND  : no CVAT  MSK: ranging neck and extremities freely  Neuro: moving all extremities spontaneously, nonfocal  Psych: normal mood and affect     A/P: 59F with bright red blood per rectum, associated abdominal cramping, n/v, possible viral illness, diverticular bleed, will obtain CT GIB, will obtain labs, will send UA/UrCx, will give IVFs, will give analgesia    CDU ADDENDUM: Colitis noted on CT, will give Cipro/Flagyl, CDU for continued supportive care, IV abx, serial abdominal exams, H/H trend

## 2025-02-01 NOTE — ED PROVIDER NOTE - OBJECTIVE STATEMENT
59F with no significant pmhx presents to the ED complaining of bloody stool today. Endorsing 7-8 episodes of BRBPR with small BM. Associated abdominal cramping starting at 3am yesterday. States she feels the urge to have a bowel movement every 2 hours. Associated nausea and 4 episodes of nonbloody vomiting. States she had colonoscopy 2 years ago with no significant findings per patient. Denies hx fo diverticulosis or hemorrhoids to her knowledge. No hx of bleeding in the past. Denies A/C use. Denies recent travel, sick contacts, or recent abx. No fever, chills, chest pain, sob, lightheadedness, dizziness.

## 2025-02-01 NOTE — ED PROVIDER NOTE - PHYSICAL EXAMINATION
GEN: NAD, awake, eyes open spontaneously  EYES: normal conjunctiva, perrl  ENT: NCAT, MMM, Trachea midline  CHEST/LUNGS: Non-tachypneic, CTAB, bilateral breath sounds  CARDIAC: Non-tachycardic, normal perfusion  ABDOMEN: Soft, mild lower abdominal ttp bilaterally, No rebound/guarding  MSK: No edema, no gross deformity of extremities  SKIN: No rashes, no petechiae, no vesicles  NEURO: A&Ox3, moving all extremities, no focal deficits.

## 2025-02-01 NOTE — ED PROVIDER NOTE - PROGRESS NOTE DETAILS
Attending MD Esteves: Reviewed labs and CT with patient, reports has had single episode of bloody stool while here, offered CDU, amenable, will give Cipro/Flagyl.

## 2025-02-02 LAB
CULTURE RESULTS: SIGNIFICANT CHANGE UP
SPECIMEN SOURCE: SIGNIFICANT CHANGE UP

## 2025-02-02 NOTE — ED POST DISCHARGE NOTE - DETAILS
2/2: spoke with patient, discussed 6 mo repeat imaging, advised follow up with PMD for surveillance of this finding - Ne Barber PA-C

## 2025-02-03 RX ORDER — VALSARTAN 80 MG
1 TABLET ORAL
Refills: 0 | DISCHARGE